# Patient Record
Sex: MALE | Race: WHITE | NOT HISPANIC OR LATINO | URBAN - METROPOLITAN AREA
[De-identification: names, ages, dates, MRNs, and addresses within clinical notes are randomized per-mention and may not be internally consistent; named-entity substitution may affect disease eponyms.]

---

## 2018-09-02 ENCOUNTER — EMERGENCY (EMERGENCY)
Facility: HOSPITAL | Age: 53
LOS: 1 days | Discharge: HOME | End: 2018-09-02
Attending: EMERGENCY MEDICINE | Admitting: EMERGENCY MEDICINE
Payer: COMMERCIAL

## 2018-09-02 VITALS
DIASTOLIC BLOOD PRESSURE: 95 MMHG | TEMPERATURE: 98 F | HEART RATE: 95 BPM | SYSTOLIC BLOOD PRESSURE: 149 MMHG | RESPIRATION RATE: 16 BRPM | OXYGEN SATURATION: 100 %

## 2018-09-02 VITALS
TEMPERATURE: 98 F | HEART RATE: 86 BPM | RESPIRATION RATE: 19 BRPM | SYSTOLIC BLOOD PRESSURE: 134 MMHG | OXYGEN SATURATION: 100 % | DIASTOLIC BLOOD PRESSURE: 85 MMHG

## 2018-09-02 LAB
INR BLD: 1.53 — HIGH (ref 0.88–1.17)
PROTHROM AB SERPL-ACNC: 17.7 SEC — HIGH (ref 9.8–13.1)

## 2018-09-02 PROCEDURE — 99284 EMERGENCY DEPT VISIT MOD MDM: CPT

## 2018-09-02 PROCEDURE — 93971 EXTREMITY STUDY: CPT | Mod: 26,LT

## 2018-09-02 RX ORDER — ACETAMINOPHEN 500 MG
650 TABLET ORAL ONCE
Qty: 0 | Refills: 0 | Status: COMPLETED | OUTPATIENT
Start: 2018-09-02 | End: 2018-09-02

## 2018-09-02 RX ORDER — IBUPROFEN 200 MG
600 TABLET ORAL ONCE
Qty: 0 | Refills: 0 | Status: COMPLETED | OUTPATIENT
Start: 2018-09-02 | End: 2018-09-02

## 2018-09-02 RX ADMIN — Medication 600 MILLIGRAM(S): at 16:58

## 2018-09-02 RX ADMIN — Medication 650 MILLIGRAM(S): at 16:58

## 2018-09-02 RX ADMIN — Medication 600 MILLIGRAM(S): at 18:43

## 2018-09-02 NOTE — ED ADULT NURSE NOTE - NSIMPLEMENTINTERV_GEN_ALL_ED
Implemented All Fall Risk Interventions:  [place] to call system. Call bell, personal items and telephone within reach. Instruct patient to call for assistance. Room bathroom lighting operational. Non-slip footwear when patient is off stretcher. Physically safe environment: no spills, clutter or unnecessary equipment. Stretcher in lowest position, wheels locked, appropriate side rails in place. Provide visual cue, wrist band, yellow gown, etc. Monitor gait and stability. Monitor for mental status changes and reorient to person, place, and time. Review medications for side effects contributing to fall risk. Reinforce activity limits and safety measures with patient and family.

## 2018-09-02 NOTE — ED PROVIDER NOTE - PHYSICAL EXAMINATION
ROM in tact in all extremities  No focal deficits  Strength in tact in all extremities  Pain with bearing weight ROM in tact in all extremities  No focal deficits  Strength intact in all extremities  Pain with bearing weight (left knee)

## 2018-09-02 NOTE — ED PROVIDER NOTE - MUSCULOSKELETAL, MLM
Spine appears normal, range of motion is not limited, no muscle or joint tenderness Spine appears normal, range of motion is not limited, no muscle or joint tenderness - FROM at left knee, no joint instabilty, indicates popliteal pain, but no tenderness or swelling.

## 2018-09-02 NOTE — ED PROVIDER NOTE - ATTENDING CONTRIBUTION TO CARE
Attending Attestation: Dr. Carmona  I have personally performed a history and physical examination of the patient and discussed management with the resident as well as the patient.  I reviewed the resident's note and agree with the documented findings and plan of care.  I have authored and modified critical sections of the Provider Note, including but not limited to HPI, Physical Exam and MDM. 53M presenting with rt. 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 Likely ligamentous strain of the knee. Will give pain control, check INR for coumadin, crutches, ace wrap and MRI outpatient via ortho.

## 2018-09-02 NOTE — ED PROVIDER NOTE - OBJECTIVE STATEMENT
53M presenting with rt. knee pain. Pt has discomfort in the back of his knee about 1 month ago. Pt is walking today, felt a pop and cannot bear weight due to pain. Pt has hx of PEs, Factor V Laden, on Coumadin. Pt has hx of IVC filter as well. Pt denies direct trauma to the knee. NO fever, chills, nausea or vomiting. No CP/BP. No GI/ sxs. 53M presenting with rt. knee pain. Pt has had discomfort in the back of his knee for about 1 month since [place]. Pt was walking today, felt a pop and cannot bear weight due to pain. Pt has hx of PE's, Factor V Leiden, on Coumadin. Pt has hx of IVC filter as well. Denies direct trauma to the knee today. NO fever, chills, nausea or vomiting. No CP/BP. No GI/ sxs.

## 2018-09-02 NOTE — ED ADULT NURSE NOTE - CHIEF COMPLAINT QUOTE
Pt c/o intermittent  R  behind knee and R calf  pain x 1 mth , worse over the past few days, and today he  heard something popped while  he was walking.  Pt with difficulty walking. Denies injury.  Pt is on Warfarin.  Pt drove to [facility] last weekend.   Denies sob, back pain

## 2018-09-02 NOTE — ED ADULT TRIAGE NOTE - CHIEF COMPLAINT QUOTE
Pt c/o intermittent  R  behind knee and R calf  pain x 1 mth , worse over the past few days, and today he  heard something popped while  he was walking.  Pt with difficulty walking. Denies injury.  Pt is on Warfarin.  Pt drove to [facility] last weekend.   Denies sob, back pain

## 2018-09-02 NOTE — ED PROVIDER NOTE - PROGRESS NOTE DETAILS
Pain improved after Motrin and Tylenol. Pt will be ACE wrapped, given crutches and f/u with Ortho. Pt agrees. Pain improved after Motrin and Tylenol. Pt will be ACE wrapped, given crutches and f/u with Ortho. Pt agrees. INR subTx, states he has had fluctuating INR's.  Lately on 5mg daily.  Will advise for 7.5 mg tonight and tomorrow night, then to call PMD on TUESDAY for repeat INR. Pt understands and agrees.

## 2018-09-02 NOTE — ED PROVIDER NOTE - CARE PLAN
Principal Discharge DX:	Knee pain Principal Discharge DX:	Knee pain  Secondary Diagnosis:	Subtherapeutic international normalized ratio (INR)

## 2018-09-02 NOTE — ED ADULT NURSE NOTE - OBJECTIVE STATEMENT
Received pt. in room 28 alert and oriented x 4, vss. C/o pain in right calf area with ambulation. Right leg warm to touch, positive pedal pulse present. Pt. stated that his right leg has been hurting for the past month " it became worst today and I am having difficulty walking". Labs sent medicated as ordered, will continue to monitor.

## 2018-09-02 NOTE — ED PROVIDER NOTE - MEDICAL DECISION MAKING DETAILS
53M presenting with rt. 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 Likely ligamentous strain of the knee. Will give pain control, check INR for coumadin, crutches, ace wrap and MRI outpatient. 53M presenting with rt. 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 Likely ligamentous strain of the knee. Will give pain control, check INR for coumadin, crutches, ace wrap and MRI outpatient via ortho.

## 2021-03-15 NOTE — ED PROVIDER NOTE - NS ED MD DISPO DISCHARGE CCDA
Spoke with pts wife [facility].   Patient is scheduled for dexcom training 4/14 Patient/Caregiver provided printed discharge information.

## 2022-09-28 PROBLEM — D68.51 ACTIVATED PROTEIN C RESISTANCE: Chronic | Status: ACTIVE | Noted: 2018-09-02

## 2022-10-03 ENCOUNTER — APPOINTMENT (OUTPATIENT)
Dept: ORTHOPEDIC SURGERY | Facility: CLINIC | Age: 57
End: 2022-10-03

## 2022-10-03 VITALS — WEIGHT: 170 LBS | BODY MASS INDEX: 27.32 KG/M2 | HEIGHT: 66 IN

## 2022-10-03 DIAGNOSIS — Z87.891 PERSONAL HISTORY OF NICOTINE DEPENDENCE: ICD-10-CM

## 2022-10-03 DIAGNOSIS — I10 ESSENTIAL (PRIMARY) HYPERTENSION: ICD-10-CM

## 2022-10-03 DIAGNOSIS — E78.00 PURE HYPERCHOLESTEROLEMIA, UNSPECIFIED: ICD-10-CM

## 2022-10-03 DIAGNOSIS — Z98.1 ARTHRODESIS STATUS: ICD-10-CM

## 2022-10-03 PROCEDURE — 99072 ADDL SUPL MATRL&STAF TM PHE: CPT

## 2022-10-03 PROCEDURE — 99214 OFFICE O/P EST MOD 30 MIN: CPT

## 2022-10-03 NOTE — DISCUSSION/SUMMARY
[de-identified] : 1) The patient may use OTC Tylenol PRN.\par 2) Pt will continue with activity modification and home exercise as tolerated.  The patient should avoid exercise and activity that aggravates pain. \par \par \par The patient will continue with conservative treatment and will F/U in 4 months.\par \par NSAIDs- Patient warned of risk of medication to GI tract, increased blood pressure, cardiac risk, and risk of fluid retention. Advised to clear medication with internist or PCP if any concurrent health problem with heart, blood pressure, or GI system exists.  The risks, benefits and alternatives of NSAIDs were discussed.  The patient was instructed on the proper usage of  NSAIDs. \par \par The patient was advised of the diagnosis.  The natural history of the pathology was explained in full to the patient in layman's terms, including but not limited to the risks, symptoms and available options for treatment.  We discussed the risks, benefits and alternatives of the treatment options and the advice I provided to the patient as listed above.  Pt was given the opportunity to ask questions, and all questions were answered.  The discussion was not limited to the above.\par \par Entered by Serafin Wyatt acting as scribe.\par

## 2022-10-03 NOTE — WORK
[Other: ___] : [unfilled] [Was the competent medical cause of the injury] : was the competent medical cause of the injury [Are consistent with the injury] : are consistent with the injury [Consistent with my objective findings] : consistent with my objective findings [Total] : total [Cannot return to work because ________] : cannot return to work because [unfilled] [N/A] : : Not Applicable [No] : No [No Rx restrictions] : No Rx restrictions. [I provided the services listed above] :  I provided the services listed above. [FreeTextEntry1] : His problem is permanent

## 2022-10-03 NOTE — IMAGING
[de-identified] : Cervical Spine Exam: Pain is located over the superior right trapezius.  Rotational ROM is significantly limited to approx. 20 left and 10 degrees to the right.  Extension is nil. Flexion is 1/3 normal. DTRs UE of the biceps and triceps.  Zero right biceps 2+ and triceps 1+ on the left.  Obvious gross atrophy of the right biceps and triceps.  Triceps strength is 2-3/5 on the right.  Biceps strength is 3/5 with speed's test showing great degree of weakness.  Pain remains located right and left of the midline C-spine at the mid C-spine level and radiates inferiorly into the dorsal spine region, left paraspinal area.\par \par The exam above is based on observations during the most recent in-person office visit.

## 2022-10-03 NOTE — HISTORY OF PRESENT ILLNESS
[Neck] : neck [Work related] : work related [8] : 8 [6] : 6 [Radiating] : radiating [Constant] : constant [Household chores] : household chores [Leisure] : leisure [Meds] : meds [de-identified] : Patient Complaint - Dr. Loera's notes:\par wc 1/23/05- injured the neck- spinal surgery 4/05- neck pain to the rt shoulder. numbness Left(ulna)> rt. Difficulty with\par adl. Hard to get to and stay asleep. MRI - stress fx of the acromion. Severe pain in the neck- down the left arm with\par numbness and shooting pain to the low back too. Weakness of triceps and finger extension. Gets flexion deformity of\par the little and ring finger at times-needs to concentrate to extend. 9/22- severe pain into the left neck and shoulder\par area again- worse in July and Aug-12/8- still in pain and limited rom. Dropping things more over the past 2 months.3/9-\par still pain and dropping things. 6/1/10: Still pain and stiffness in his neck with tingling and weakness into his hands. OOW.\par Pain in the left lateral hip pain with walking. Pain in the toes. 9/7/10: Still pain and stiffness in his neck, shoulders and\par arms. OOW. Stretching. 12/7/10: Still neck pain and now pain is radiating down his left arm into his left fingers with\par tingling at times. OOW. Increase pain down the left arm with rom to the left. 1/27/11: He continues to experience pain\par in is neck, left shoulder, and arm. Finished 6 wks of PT - helping. Still unable to move his left ring and pinky fingers.\par OOW. 3/10/11 Still same pain in his neck, left shoulder, and left arm, still unable to open his left hand fully. OOW.\par 6/14/11: He continues to experience pain in his neck radiating into his arms bilaterally. OOW. CT scan C/SPINE: C2-3\par and C7-T1 disc degen, Severe foraminal stenosis. MRI C/SPINE: progressive kyphosis in the upper cervical spine,\par stenosis, DDD. C7 lateral screws encroaching on the b/l facet joints, L>>R. H/O DVT. 9/8/11: He continues to suffer\par with pain and stiffness in his neck radiating into his shoulders. Increased pain in his fingers bilaterally L>>R. OOW.\par 1/10/12: He remains symptomatic. 5/24/12: Still pain and stiffness in his neck and shoulders. OOW. HEP. 10/4/12:\par Continued pain in his neck and shoulders - worse with damp weather. OOW. HEP and stretching. Had cervical fusion in\par past. 2/5/13: Neck and shoulders remain symptomatic. OOW. HEP and stretching. 6/18/13: pt here for f/u neck/tspine.\par he is stable, no change in symptoms. has been doing home exercsies. he is taking vicodin for pain, requesting refill\par today. 4/10/14: Pt here for f/u c spine. He is stable, little change from last visit. Doing home exercises. Taking vicodin\par as needed for pain. 7/29/14: Still pain and stiffness in his neck with radiating pain down his arms. HEP. OOW. 10/17/14-\par Still pain in the neck with limited rom and difficulty with adl. 4/21/15: continued pain in his neck, shoulders and lower\par back. Continues to have shooting/radiating pains down the left. HEP/stretching. OOW\par 10/6/15: Following up on his right shoulder and neck. OOW. HEP and stretching.\par 3/1/16: Follow up on his neck and right shoulder. OOW. HEP/stretching\par 3/1/16: F/U c-spine. Pain level dependent on level of activity. Prolonged sitting causes more stiffness. Performs HEP.\par Vicodin prn. OOW.\par ______________\par Dr. Knowles's Notes: \par 7/27/16: f/u c-spine. Reports symptoms about the same. HEP intermittently. He reports symptoms worse with increased\par activity, but he is essentially at his baseline otherwise. Vicodin 7.5/300 mg. He is OOW.\par 12/14/16: WC Case DOI: 01.23.05\par Pt presnts for f/u. He reports that overall his pain remains at his regular baseline and remains unchanged in regards to\par location, intensity, inciting factors and quality of his pain. He reports that he continues to experience frequent\par nocturnal awakenings due to pain.\par 4/25/17:  Case DOI: 01.23.05\par Pt presents routine f/u. He reports that his condition remains stable and essentially unchanged. He reports that his pain\par can be greatly affected by changes in the weather. He reports continuing variable radicular symptoms into b/l hands. He\par reports that he has been occasionally dropping objects when held in the right hand He reports that his pain remains the\par same in regards to location, inciting factors and quality of his pain.\par 8/8/17:  Case DOI: 01.23.05\par Follow up neck pain. He states symptoms are about the same. He continues to have pain in the neck and into his arm.\par He reports occasional weakness.\par 12/21/17:  Case DOI: 01.23.05\par Pt presents for routine f/u for his neck. He reports that overall his pain is variable in intensity and remains unchanged in\par regards to location, inciting factors and quality. He feels that his pain can be aggravated with the changes in the\par weather and colder temperatures. He reports that he continues to obtain good partial relief from his medication which\par allows him to perform more of his ADLs and experience less frequent nocturnal awakening.\par 5/17/18:  Case DOI: 01.23.05\par Pt presents for routine f/u neck. He reports that recently he feels that he is experiencing some worsening neck pain. He\par feels that this change might be related to changes in the weather and temperature. He reports his pain is located over\par the right paracervical / rhomboid area.\par 8/6/18:  Case DOI: 01.23.05\par Pt presents for f/u neck. He reports that overall his pain is variable in intensity and can experience exacerbations of his\par pain which can become intense. He reports that when intense his range of motion becomes very limited and he has\par difficulty turning his head left and right which can limit his ability to drive his car. He feels that his pain is aggravated\par with changes in the weather and humidity during the summer. He reports that his pain can be mostly located over the\par right paracervical region and occasionally throughout the right arm. He reports weakness over his right arm.\par 1/15/19:  Case DOI: 01.23.05\par Pt presents for f/u neck. He reports that overall his neck pain continues to be variable in intensity with occasional flare\par ups of pain. He reports that he experiences increasing stiffness when the humidity rises.\par 6/6/19: WC Case DOI: 01.23.05\par Pt present for routine f/u. He reports that his condition has remained essentially unchanged, with variable pain and\par occasional flare ups of pain. Pain continues to be aggravated with changes in the weather and temperature, also\par aggravated with heavier than normal activity levels. He also maintains daily activity modifications to minimize\par aggravating his pain.\par 10/29/19: WC Case DOI: 01.23.05\par Pt presents for routine f/u. He reports that overall his neck pain has remained essentially unchanged. He reports he\par continues to experience occasional flare ups of pain. He reports variable spasms throughout the right UE and right hand.\par He reports that his pain can be aggravated with heavier than normal activity levels.\par 8/6/20: WC Case DOI: 01.23.05\par Pt presents for routine f/u neck. He reports that overall his neck pain remains variable in intensity. With intermittent\par episodes of more intense pain. He reports that his pain can be aggravated with changes in the weather and any\par increased activity levels. He maintains daily activity modifications to minimize aggravation of his neck pain.\par 2/16/21: WC Case DOI: 01.23.05\par Pt presents for routine f/up neck. He reports that his condition has remained stable and essentially unchanged in\par regards to location and inciting factors of his pain.\par 6/11/2021: WC Case DOI: 01.23.05\par Pt presents for routine f/up neck.He reports that overall his neck pain has remained essentially unchanged. Reports\par Chronic right biceps and triceps weakness. \par 3/21/2022: WC Case DOI: 01.23.05\par Pt presents for routine f/up neck. He reports that overall his neck pain has remained essentially unchanged. He reports\par a sense that the weakness in the arms has became worse since the last visit the neck pain is over the midline from the\par C7 process to approx. C5.\par \par 10/03/2022: WC Case DOI: 01/23/05\par Pt reports that overall there has been no substantial change in the intensity or location of his symptoms.  He does not have a sense that the upper extremity weakness is progressing.  The patient presently manages the symptoms with Tylenol.  The patient is currently out of work on permanent disability. [] : no [FreeTextEntry1] : Cervical spine and neck [FreeTextEntry3] : 01/23/2005 [FreeTextEntry6] : Stiffness  [FreeTextEntry7] : neck downward to right arm [de-identified] : Activity  [de-identified] : 2005

## 2023-01-13 ENCOUNTER — NON-APPOINTMENT (OUTPATIENT)
Age: 58
End: 2023-01-13

## 2023-11-28 ENCOUNTER — APPOINTMENT (OUTPATIENT)
Dept: ORTHOPEDIC SURGERY | Facility: CLINIC | Age: 58
End: 2023-11-28
Payer: OTHER GOVERNMENT

## 2023-11-28 VITALS — WEIGHT: 170 LBS | HEIGHT: 66 IN | BODY MASS INDEX: 27.32 KG/M2

## 2023-11-28 DIAGNOSIS — Z00.00 ENCOUNTER FOR GENERAL ADULT MEDICAL EXAMINATION W/OUT ABNORMAL FINDINGS: ICD-10-CM

## 2023-11-28 DIAGNOSIS — M54.12 RADICULOPATHY, CERVICAL REGION: ICD-10-CM

## 2023-11-28 PROCEDURE — 99214 OFFICE O/P EST MOD 30 MIN: CPT

## 2024-08-05 ENCOUNTER — APPOINTMENT (OUTPATIENT)
Dept: ORTHOPEDIC SURGERY | Facility: CLINIC | Age: 59
End: 2024-08-05

## 2024-08-05 PROCEDURE — 99213 OFFICE O/P EST LOW 20 MIN: CPT

## 2024-08-06 NOTE — PHYSICAL EXAM
[Rotation to left] : rotation to left [Rotation to right] : rotation to right [3___] : right biceps 3[unfilled]/5 [2___] : right triceps 2[unfilled]/5 [] : negative Nevarez reflex [FreeTextEntry9] : Pain remains located right and left of the midline C-spine at the mid C-spine level and radiates inferiorly into the dorsal spine region, left paraspinal area. [de-identified] : unchanged from previous exam

## 2024-08-06 NOTE — PHYSICAL EXAM
[Rotation to left] : rotation to left [Rotation to right] : rotation to right [3___] : right biceps 3[unfilled]/5 [2___] : right triceps 2[unfilled]/5 [] : negative Nevarez reflex [FreeTextEntry9] : Pain remains located right and left of the midline C-spine at the mid C-spine level and radiates inferiorly into the dorsal spine region, left paraspinal area. [de-identified] : unchanged from previous exam

## 2024-08-06 NOTE — HISTORY OF PRESENT ILLNESS
[Neck] : neck [de-identified] : WC DOI 1/2005: former  who developed neck pain and B upper extremity pain that occurred after years of work. Patient Had a Cervical disc replacement C3-6 with Dr. Pinedo -JAMES with some relief.  8/5/24: here for f/u CS. continues with neck pain posteriorly at times down the RUE. Symptoms stable. No changes. No treatments currently. OOW -here for maintenance` visit/disability ppw.   59 yo LHD M presenting with neck pain that occurred after injury at work 2005.Has been treated by [facility]; here for F/U from previous visit. Patient continues to have neck discomfort with intermittent upper extremity pain, manages symptoms conservatively. Taking tylenol prn. Patient denies any change in symptoms at this time- denies change in dexterity or fine motor skills. has been Routinely following up with Dr. Knowles for maintenance. Patient remains out of work on permanent disability.  [FreeTextEntry5] : Follow Up C Spine. No changes since last visit.

## 2024-08-06 NOTE — ASSESSMENT
[FreeTextEntry1] : 60 yo M with stable, chronic neck pain from work related injury in 2005. Hx of three level cervical surgery by surgeon at [facility]. Was followed by Dr Knowles every 3-4 months for routine maintaince visits but is no longer in practice. Patient is permanently disabled. It is best we transfer his care to nonoperative provider who is more experienced and inclied toward doing disability/impairment. I believe he would be better suited with a provider whose practice is optimized for disability and impairment determinations.

## 2024-08-06 NOTE — HISTORY OF PRESENT ILLNESS
[Neck] : neck [de-identified] : WC DOI 1/2005: former  who developed neck pain and B upper extremity pain that occurred after years of work. Patient Had a Cervical disc replacement C3-6 with Dr. Pinedo -JAMES with some relief.  8/5/24: here for f/u CS. continues with neck pain posteriorly at times down the RUE. Symptoms stable. No changes. No treatments currently. OOW -here for maintenance` visit/disability ppw.   59 yo LHD M presenting with neck pain that occurred after injury at work 2005.Has been treated by [facility]; here for F/U from previous visit. Patient continues to have neck discomfort with intermittent upper extremity pain, manages symptoms conservatively. Taking tylenol prn. Patient denies any change in symptoms at this time- denies change in dexterity or fine motor skills. has been Routinely following up with Dr. Knowles for maintenance. Patient remains out of work on permanent disability.  [FreeTextEntry5] : Follow Up C Spine. No changes since last visit.

## 2024-08-06 NOTE — ASSESSMENT
[FreeTextEntry1] : 58 yo M with stable, chronic neck pain from work related injury in 2005. Hx of three level cervical surgery by surgeon at [facility]. Was followed by Dr Knowles every 3-4 months for routine maintaince visits but is no longer in practice. Patient is permanently disabled. It is best we transfer his care to nonoperative provider who is more experienced and inclied toward doing disability/impairment. I believe he would be better suited with a provider whose practice is optimized for disability and impairment determinations.

## 2024-09-18 ENCOUNTER — APPOINTMENT (OUTPATIENT)
Dept: PHYSICAL MEDICINE AND REHAB | Facility: CLINIC | Age: 59
End: 2024-09-18

## 2024-09-25 ENCOUNTER — APPOINTMENT (OUTPATIENT)
Dept: PHYSICAL MEDICINE AND REHAB | Facility: CLINIC | Age: 59
End: 2024-09-25
Payer: OTHER GOVERNMENT

## 2024-09-25 DIAGNOSIS — R29.898 OTHER SYMPTOMS AND SIGNS INVOLVING THE MUSCULOSKELETAL SYSTEM: ICD-10-CM

## 2024-09-25 DIAGNOSIS — Z98.1 ARTHRODESIS STATUS: ICD-10-CM

## 2024-09-25 PROCEDURE — 99203 OFFICE O/P NEW LOW 30 MIN: CPT

## 2024-09-26 NOTE — ASSESSMENT
[FreeTextEntry1] : 59 year old man with h/o cervical disc replacement, upper ext weakness previous notes reviewed  on disability since work injury in 2005, referred by Dr. Gonzales  no change in pain, exam, manages symptoms conservatively  tylenol as needed for pain

## 2024-09-26 NOTE — PHYSICAL EXAM
[Normal] : Oriented to person, place, and time, insight and judgement were intact and the affect was normal [de-identified] : decreased cervical spine ROM [de-identified] : breathing comfortably [de-identified] : warm, well perfused  [de-identified] : elbow extension 2/5 b/l, bradioradialis 4/5, antalgic gait [de-identified] : sensation intact to light touch

## 2024-09-26 NOTE — HISTORY OF PRESENT ILLNESS
[FreeTextEntry1] : Patient is a 59 year old man who presents for evaluation. He is s/p cervical disc replacement, after work injury as  in January 2005. Since then, he has some neck stiffness, and b/l triceps weakness. He does not take any regular medications for pain. No weakness in hands, denies numbness in upper extremities. He is out of work on permanent disability.   Also with right knee pain, tried PRP, feels it may be helping, trying to be active, walking. Had left TKR, hoping to wait until the winter for right TKR. Wears right ankle brace/air cast for fracture.

## 2024-09-26 NOTE — PHYSICAL EXAM
[Normal] : Oriented to person, place, and time, insight and judgement were intact and the affect was normal [de-identified] : decreased cervical spine ROM [de-identified] : breathing comfortably [de-identified] : warm, well perfused  [de-identified] : elbow extension 2/5 b/l, bradioradialis 4/5, antalgic gait [de-identified] : sensation intact to light touch